# Patient Record
Sex: FEMALE | Race: WHITE
[De-identification: names, ages, dates, MRNs, and addresses within clinical notes are randomized per-mention and may not be internally consistent; named-entity substitution may affect disease eponyms.]

---

## 2020-08-25 ENCOUNTER — HOSPITAL ENCOUNTER (OUTPATIENT)
Dept: HOSPITAL 61 - MAMMO | Age: 45
Discharge: HOME | End: 2020-08-25
Attending: FAMILY MEDICINE
Payer: COMMERCIAL

## 2020-08-25 DIAGNOSIS — Z12.31: Primary | ICD-10-CM

## 2020-08-25 PROCEDURE — 77067 SCR MAMMO BI INCL CAD: CPT

## 2020-08-25 PROCEDURE — 77063 BREAST TOMOSYNTHESIS BI: CPT

## 2020-08-27 NOTE — RAD
DATE: 8/25/2020 3:40 PM



EXAM: MAMMO JEAN-PIERRE SCREENING BILATERAL



HISTORY:  Screening.



COMPARISON: None. This is a baseline.



Bilateral CC and MLO views of the breasts were performed. Bilateral breast

tomosynthesis was performed in CC and MLO projections.



This study was interpreted with the benefit of Computerized Aided Detection

(CAD).





FINDINGS:



Breast Density: HETERO  The breast parenchyma Is heterogeneously dense, which

could reduce sensitivity of mammography. Breast parenchyma level C



Nodular parenchymal pattern compatible benign cystic change.

No suspicious masses, microcalcifications or architectural distortion is

present to suggest malignancy in either breast.





The visualized axillae are unremarkable. 



IMPRESSION: No mammographic evidence of malignancy. 



BI-RADS CATEGORY: 2 BENIGN FINDING(S)



RECOMMENDED FOLLOW-UP: 12M 12 MONTH FOLLOW-UP Annual screening mammography is

recommended, unless clinically indicated sooner based on symptoms or change in

physical exam.



PQRS compliance statement: Patient information was entered into a reminder

system with a target due date for the next mammogram.



Mammography is a sensitive method for finding small breast cancers, but it

does not detect them all and is not a substitute for careful clinical

examination.  A negative mammogram does not negate a clinically suspicious

finding and should not result in delay in biopsying a clinically suspicious

abnormality.



"Our facility is accredited by the American College of [employer] Mammography

Program."

## 2021-08-30 ENCOUNTER — HOSPITAL ENCOUNTER (OUTPATIENT)
Dept: HOSPITAL 61 - MAMMO | Age: 46
End: 2021-08-30
Attending: FAMILY MEDICINE
Payer: COMMERCIAL

## 2021-08-30 DIAGNOSIS — Z12.31: Primary | ICD-10-CM

## 2021-08-30 PROCEDURE — 77063 BREAST TOMOSYNTHESIS BI: CPT

## 2021-08-30 PROCEDURE — 77067 SCR MAMMO BI INCL CAD: CPT

## 2021-08-30 NOTE — RAD
INDICATION: 45 years of age asymptomatic female patient presents for screening mammography.



TECHNIQUE:  Full field craniocaudal and mediolateral oblique images of both breasts were obtained usi
ng digital technique with tomosynthesis and also analyzed with computer-aided detection software.



COMPARISON: Prior mammographic imaging dating back to 8/25/2020.



BREAST COMPOSITION: Category C: The breast tissue is heterogeneously dense, which could obscure detec
tion of small masses.



FINDINGS:

The parenchymal pattern appears stable.  Benign calcifications are present.



No suspicious masses, microcalcifications or architectural distortion is present to suggest malignanc
y in either breast.



The visualized axillae are unremarkable. 



IMPRESSION: No mammographic evidence of malignancy. 



RECOMMENDATION: Annual screening mammography is recommended, unless clinically indicated sooner based
 on symptoms or change in physical exam.



BIRADS 2: BENIGN 



This study was interpreted with the benefit of Computerized Aided Detection (CAD).



?Your patient's mammogram demonstrates that she has dense breast tissue (breast density category C or
 D), which could hide abnormalities, and if she has other risk factors for breast cancer that have be
en identified, she might benefit from supplemental screening tests that may be suggested by you as he
r ordering physician. Dense breast tissue, in and of itself, is a relatively common condition. Theref
ore, this information is not provided to cause undue concern, but rather to raise your awareness and 
to promote discussion with your patient regarding the presence of other risk factors, in addition to 
dense breast tissue. Your patient's mammography results will be sent to her.



Patient information is entered into the reminder system with a target due date for the next screening
 mammogram.



Mammography is the most sensitive method for finding small breast cancers, but it does not detect the
m all and is not a substitute for careful clinical examination. A negative mammogram does not negate 
a clinically suspicious finding and should not result in delay in biopsying a clinically suspicious a
bnormality.



 "Our facility is accredited by the American College of [employer] Mammography Program."



Electronically signed by: Kodi Doran MD (8/30/2021 4:38 PM) [facility]

## 2021-12-14 ENCOUNTER — HOSPITAL ENCOUNTER (EMERGENCY)
Dept: HOSPITAL 61 - ER | Age: 46
Discharge: HOME | End: 2021-12-14
Payer: COMMERCIAL

## 2021-12-14 VITALS — WEIGHT: 142.2 LBS | BODY MASS INDEX: 23.69 KG/M2 | HEIGHT: 65 IN

## 2021-12-14 VITALS — DIASTOLIC BLOOD PRESSURE: 77 MMHG | SYSTOLIC BLOOD PRESSURE: 123 MMHG

## 2021-12-14 DIAGNOSIS — R91.1: ICD-10-CM

## 2021-12-14 DIAGNOSIS — Z20.822: ICD-10-CM

## 2021-12-14 DIAGNOSIS — F43.10: ICD-10-CM

## 2021-12-14 DIAGNOSIS — Z87.891: ICD-10-CM

## 2021-12-14 DIAGNOSIS — F79: Primary | ICD-10-CM

## 2021-12-14 LAB
ALBUMIN SERPL-MCNC: 3.5 G/DL (ref 3.4–5)
ALBUMIN/GLOB SERPL: 1.1 {RATIO} (ref 1–1.7)
ALP SERPL-CCNC: 54 U/L (ref 46–116)
ALT SERPL-CCNC: 23 U/L (ref 14–59)
AMORPH SED URNS QL MICRO: PRESENT /HPF
AMPHETAMINE/METHAMPHETAMINE: (no result)
ANION GAP SERPL CALC-SCNC: 8 MMOL/L (ref 6–14)
APTT PPP: YELLOW S
AST SERPL-CCNC: 19 U/L (ref 15–37)
BACTERIA #/AREA URNS HPF: (no result) /HPF
BARBITURATES UR-MCNC: (no result) UG/ML
BASOPHILS # BLD AUTO: 0 X10^3/UL (ref 0–0.2)
BASOPHILS NFR BLD: 1 % (ref 0–3)
BENZODIAZ UR-MCNC: (no result) UG/L
BILIRUB SERPL-MCNC: 0.2 MG/DL (ref 0.2–1)
BILIRUB UR QL STRIP: NEGATIVE
BUN SERPL-MCNC: 11 MG/DL (ref 7–20)
BUN/CREAT SERPL: 14 (ref 6–20)
CALCIUM SERPL-MCNC: 9 MG/DL (ref 8.5–10.1)
CANNABINOIDS UR-MCNC: (no result) UG/L
CHLORIDE SERPL-SCNC: 92 MMOL/L (ref 98–107)
CO2 SERPL-SCNC: 31 MMOL/L (ref 21–32)
COCAINE UR-MCNC: (no result) NG/ML
CREAT SERPL-MCNC: 0.8 MG/DL (ref 0.6–1)
EOSINOPHIL NFR BLD: 0.1 X10^3/UL (ref 0–0.7)
EOSINOPHIL NFR BLD: 3 % (ref 0–3)
ERYTHROCYTE [DISTWIDTH] IN BLOOD BY AUTOMATED COUNT: 12.9 % (ref 11.5–14.5)
FIBRINOGEN PPP-MCNC: (no result) MG/DL
GFR SERPLBLD BASED ON 1.73 SQ M-ARVRAT: 77.9 ML/MIN
GLUCOSE SERPL-MCNC: 111 MG/DL (ref 70–99)
HCT VFR BLD CALC: 32.9 % (ref 36–47)
HGB BLD-MCNC: 11 G/DL (ref 12–15.5)
LYMPHOCYTES # BLD: 2.4 X10^3/UL (ref 1–4.8)
LYMPHOCYTES NFR BLD AUTO: 57 % (ref 24–48)
MCH RBC QN AUTO: 30 PG (ref 25–35)
MCHC RBC AUTO-ENTMCNC: 33 G/DL (ref 31–37)
MCV RBC AUTO: 90 FL (ref 79–100)
METHADONE SERPL-MCNC: (no result) NG/ML
MONO #: 0.4 X10^3/UL (ref 0–1.1)
MONOCYTES NFR BLD: 9 % (ref 0–9)
NEUT #: 1.3 X10^3/UL (ref 1.8–7.7)
NEUTROPHILS NFR BLD AUTO: 31 % (ref 31–73)
NITRITE UR QL STRIP: NEGATIVE
OPIATES UR-MCNC: (no result) NG/ML
PCP SERPL-MCNC: (no result) MG/DL
PH UR STRIP: 6.5 [PH]
PLATELET # BLD AUTO: 201 X10^3/UL (ref 140–400)
POTASSIUM SERPL-SCNC: 4.2 MMOL/L (ref 3.5–5.1)
PROT SERPL-MCNC: 6.6 G/DL (ref 6.4–8.2)
PROT UR STRIP-MCNC: NEGATIVE MG/DL
RBC # BLD AUTO: 3.64 X10^6/UL (ref 3.5–5.4)
RBC #/AREA URNS HPF: (no result) /HPF (ref 0–2)
SODIUM SERPL-SCNC: 131 MMOL/L (ref 136–145)
UROBILINOGEN UR-MCNC: 1 MG/DL
WBC # BLD AUTO: 4.3 X10^3/UL (ref 4–11)
WBC #/AREA URNS HPF: (no result) /HPF (ref 0–4)

## 2021-12-14 PROCEDURE — 81001 URINALYSIS AUTO W/SCOPE: CPT

## 2021-12-14 PROCEDURE — 80053 COMPREHEN METABOLIC PANEL: CPT

## 2021-12-14 PROCEDURE — 70450 CT HEAD/BRAIN W/O DYE: CPT

## 2021-12-14 PROCEDURE — 93005 ELECTROCARDIOGRAM TRACING: CPT

## 2021-12-14 PROCEDURE — 85025 COMPLETE CBC W/AUTO DIFF WBC: CPT

## 2021-12-14 PROCEDURE — 99285 EMERGENCY DEPT VISIT HI MDM: CPT

## 2021-12-14 PROCEDURE — 84484 ASSAY OF TROPONIN QUANT: CPT

## 2021-12-14 PROCEDURE — 82962 GLUCOSE BLOOD TEST: CPT

## 2021-12-14 PROCEDURE — U0003 INFECTIOUS AGENT DETECTION BY NUCLEIC ACID (DNA OR RNA); SEVERE ACUTE RESPIRATORY SYNDROME CORONAVIRUS 2 (SARS-COV-2) (CORONAVIRUS DISEASE [COVID-19]), AMPLIFIED PROBE TECHNIQUE, MAKING USE OF HIGH THROUGHPUT TECHNOLOGIES AS DESCRIBED BY CMS-2020-01-R: HCPCS

## 2021-12-14 PROCEDURE — 36415 COLL VENOUS BLD VENIPUNCTURE: CPT

## 2021-12-14 PROCEDURE — 71045 X-RAY EXAM CHEST 1 VIEW: CPT

## 2021-12-14 PROCEDURE — 80307 DRUG TEST PRSMV CHEM ANLYZR: CPT

## 2021-12-14 NOTE — RAD
CT HEAD/BRAIN WO



History: Reason: DECREASED MENTATION, SLOWED SPEECH / Spl. Instructions:  / History: 



Comparison: None.



Technique: Noncontrast CT imaging was performed of the head.  



Exposure: One or more of the following individualized dose reduction techniques were utilized for thi
s examination:  

1. Automated exposure control  

2. Adjustment of the mA and/or kV according to patient size  

3. Use of iterative reconstruction technique.



Findings: 

No intracranial hemorrhage. No mass effect. No hydrocephalus.  Extra-axial spaces are unremarkable.



Imaged orbits are unremarkable. Imaged paranasal sinuses and mastoid air cells are clear. No acute ca
lvarial fracture.



Impression:

1.  No acute intracranial abnormality. 



Electronically signed by: Gunnar Coker DO (12/14/2021 2:23 PM) [id number]

## 2021-12-14 NOTE — RAD
XR CHEST 1V



History: Reason: CONFUSION / Spl. Instructions:  / History: 



Comparison: None.



Findings:

No consolidation or pleural effusion. Normal heart size. No pneumothorax. Stimulator projecting over 
the left chest.



Nodular opacity projecting over the left lung base measures 1.1 cm.



Impression: 

1.  No acute cardiopulmonary process.

2.  Nodular opacity projecting over the left lung base, may relate to summation artifact or nodule. R
ecommend short-term radiographic follow-up with PA and lateral view of the chest.



Electronically signed by: Gunnar Coker DO (12/14/2021 2:17 PM) [id number]

## 2021-12-14 NOTE — EKG
[facility]

              [address] [place] [address]

Test Date:    2021               Test Time:    13:22:04

Pat Name:     NALDO JAMES           Department:   

Patient ID:   [id number]           Room:          

Gender:       F                        Technician:   

:          [id number]               Requested By: MICHAEL MOYER

Order Number: [id number]           Reading MD:     

                                 Measurements

Intervals                              Axis          

Rate:         58                       P:            38

MA:           176                      QRS:          49

QRSD:         94                       T:            57

QT:           440                                    

QTc:          431                                    

                           Interpretive Statements

SINUS RHYTHM

LOW LIMB LEAD VOLTAGE

QRS(T) CONTOUR ABNORMALITY

CONSIDER ANTEROLATERAL MYOCARDIAL DAMAGE

CONSIDER INFERIOR MYOCARDIAL DAMAGE

POSSIBLY ABNORMAL ECG

RI6.01

No previous ECG available for comparison

## 2021-12-14 NOTE — PHYS DOC
Past Medical History


Additional Past Medical Histor:  PTSD, sciatica


Past Surgical History:  Hysterectomy


Smoking Status:  Former Smoker


Alcohol Use:  Occasionally





General Adult


EDM:


Chief Complaint:  ALTERED MENTAL STATUS





HPI:


HPI:





Patient is a 46-year-old female presenting for psychomotor retardation.  Reports

this is a subacute issue.  Reports she has history of behavioral health 

abnormalities otherwise no other medical diagnoses and states she has been at [facility].  She then discloses that she recently had a switch from her 

Wellbutrin to Oxcarbamazepine for mood stabilization purposes approximately 3 

weeks ago.  Reports ever since, she has had "felt fuzzy", slowed thought 

process, and slowed speech recognition and subsequent production.  Reports that 

she was seen by her mental health provider 48 hours prior but did not raise t

hese complaints to them.  States symptoms wax and wane but have been more 

constant and more noticeable over past 36 hours without any known inciting 

event, trauma, ingestion, mechanism of injury other concerning exposure.  As 

mentioned, she has no other medical issues and takes no other medications 

besides typically prescribed behavioral health meds.  Has history of alcohol, 

tobacco and "most all" illicit drug dependence with last use being approximately

4 years ago.  States she did relapse and smoke a couple puffs of a cigarette 

yesterday.  No other pertinent family history or congenital abnormalities noted





Review of Systems:


Review of Systems:


Fourteen body systems of review of systems have been reviewed. See HPI for 

pertinent positives and negative responses, other wise all other systems are 

negative, non-pertinent or non-contributory





Heart Score:


C/O Chest Pain:  No


HEART Score for Chest Pain:  








HEART Score for Chest Pain Response (Comments) Value


 


History Slighlty/Non-Suspicious 0


 


ECG Normal 0


 


Age < 45 0


 


Risk Factors No Risk Factors 0


 


Troponin < Normal Limit 0


 


Total  0








Risk Factors:


Risk Factors:  DM, Current or recent (<one month) smoker, HTN, HLP, family 

history of CAD, obesity.


Risk Scores:


Score 0 - 3:  2.5% MACE over next 6 weeks - Discharge Home


Score 4 - 6:  20.3% MACE over next 6 weeks - Admit for Clinical Observation


Score 7 - 10:  72.7% MACE over next 6 weeks - Early Invasive Strategies





Allergies:


Allergies:





Allergies








Coded Allergies Type Severity Reaction Last Updated Verified


 


  No Known Drug Allergies    12/14/21 No











Physical Exam:


PE:


General: Appears well, non toxic, and comfortable


Skin: Warm, dry. Normal for ethnicity.


HEENT: Atraumatic. PERRLA. Moist mucous membranes. 


Neck: Trachea midline. Normal ROM.


Respiratory: Normal WOB. CTAB w/o w/r/r. No tachypnea.


Cardiovascular: Regular rate and rhythm. Normal peripheral perfusion. No edema.


Abdomen: Soft. Non tender. No distension.


Back: Normal ROM.


Musculoskeletal: No swelling or deformity.


Neuro: Alert and oriented x 4. MAEE. GCS 15. Normal FNF. Negative pronator 

drift. Normal heel to shin. Normal Home. CN II-XII intact. Normal strength and 

sensation. Normal speech.


Psych: Odd affect, depressed mood, psychomotor retardation appreciated





Current Patient Data:


Labs:





Laboratory Tests








Test


 12/14/21


12:42 12/14/21


12:55 12/14/21


13:25


 


Urine Collection Type Void   


 


Urine Color Yellow   


 


Urine Clarity Cloudy   


 


Urine pH 6.5   


 


Urine Specific Gravity 1.020   


 


Urine Protein Negative mg/dL   


 


Urine Glucose (UA) Negative mg/dL   


 


Urine Ketones (Stick) Negative mg/dL   


 


Urine Blood Trace   


 


Urine Nitrite Negative   


 


Urine Bilirubin Negative   


 


Urine Urobilinogen Dipstick 1.0 mg/dL   


 


Urine Leukocyte Esterase Negative   


 


Urine RBC Occ /HPF   


 


Urine WBC Occ /HPF   


 


Urine Squamous Epithelial


Cells Few /LPF 


 


 





 


Urine Amorphous Sediment Present /HPF   


 


Urine Bacteria Few /HPF   


 


Urine Opiates Screen Neg   


 


Urine Methadone Screen Neg   


 


Urine Barbiturates Neg   


 


Urine Phencyclidine Screen Neg   


 


Urine


Amphetamine/Methamphetamine Neg 


 


 





 


Urine Benzodiazepines Screen Neg   


 


Urine Cocaine Screen Neg   


 


Urine Cannabinoids Screen Neg   


 


Urine Ethyl Alcohol Neg   


 


Glucose (Fingerstick)  112 mg/dL  


 


White Blood Count   4.3 x10^3/uL 


 


Red Blood Count   3.64 x10^6/uL 


 


Hemoglobin   11.0 g/dL 


 


Hematocrit   32.9 % 


 


Mean Corpuscular Volume   90 fL 


 


Mean Corpuscular Hemoglobin   30 pg 


 


Mean Corpuscular Hemoglobin


Concent 


 


 33 g/dL 





 


Red Cell Distribution Width   12.9 % 


 


Platelet Count   201 x10^3/uL 


 


Neutrophils (%) (Auto)   31 % 


 


Lymphocytes (%) (Auto)   57 % 


 


Monocytes (%) (Auto)   9 % 


 


Eosinophils (%) (Auto)   3 % 


 


Basophils (%) (Auto)   1 % 


 


Neutrophils # (Auto)   1.3 x10^3/uL 


 


Lymphocytes # (Auto)   2.4 x10^3/uL 


 


Monocytes # (Auto)   0.4 x10^3/uL 


 


Eosinophils # (Auto)   0.1 x10^3/uL 


 


Basophils # (Auto)   0.0 x10^3/uL 


 


Sodium Level   131 mmol/L 


 


Potassium Level   4.2 mmol/L 


 


Chloride Level   92 mmol/L 


 


Carbon Dioxide Level   31 mmol/L 


 


Anion Gap   8 


 


Blood Urea Nitrogen   11 mg/dL 


 


Creatinine   0.8 mg/dL 


 


Estimated GFR


(Cockcroft-Gault) 


 


 77.9 





 


BUN/Creatinine Ratio   14 


 


Glucose Level   111 mg/dL 


 


Calcium Level   9.0 mg/dL 


 


Total Bilirubin   0.2 mg/dL 


 


Aspartate Amino Transf


(AST/SGOT) 


 


 19 U/L 





 


Alanine Aminotransferase


(ALT/SGPT) 


 


 23 U/L 





 


Alkaline Phosphatase   54 U/L 


 


Troponin I High Sensitivity   4 ng/L 


 


Total Protein   6.6 g/dL 


 


Albumin   3.5 g/dL 


 


Albumin/Globulin Ratio   1.1 








Vital Signs:





                                   Vital Signs








  Date Time  Temp Pulse Resp B/P (MAP) Pulse Ox O2 Delivery O2 Flow Rate FiO2


 


12/14/21 12:44 97.8 59 18 132/82 (99) 100 Room Air  





 97.8       











EKG:


EKG:


EKG ordered and interpreted by myself at 1331 hrs. is sinus rhythm at 58 bpm, 

unremarkable intervals, no axis deviation, no acute ischemic findings, no STEMI





Radiology/Procedures:


Radiology/Procedures:





XR CHEST 1V





History: Reason: CONFUSION / Spl. Instructions:  / History: 





Comparison: None.





Findings:


No consolidation or pleural effusion. Normal heart size. No pneumothorax. 

Stimulator projecting over the left chest.





Nodular opacity projecting over the left lung base measures 1.1 cm.





Impression: 


1.  No acute cardiopulmonary process.


2.  Nodular opacity projecting over the left lung base, may relate to summation 

artifact or nodule. Recommend short-term radiographic follow-up with PA and 

lateral view of the chest.





///////////////////








CT HEAD/BRAIN WO





History: Reason: DECREASED MENTATION, SLOWED SPEECH / Spl. Instructions:  / 

History: 





Comparison: None.





Technique: Noncontrast CT imaging was performed of the head.  





Exposure: One or more of the following individualized dose reduction techniques 

were utilized for this examination:  


1. Automated exposure control  


2. Adjustment of the mA and/or kV according to patient size  


3. Use of iterative reconstruction technique.





Findings: 


No intracranial hemorrhage. No mass effect. No hydrocephalus.  Extra-axial 

spaces are unremarkable.





Imaged orbits are unremarkable. Imaged paranasal sinuses and mastoid air cells 

are clear. No acute calvarial fracture.





Impression:


1.  No acute intracranial abnormality. 





Electronically signed by: Gunnar Coker DO (12/14/2021 2:23 PM) [id number]





Course & Med Decision Making:


Course & Med Decision Making


ABCs unremarkable. I disclosed entirety of ER findings and discussed most likely

diagnosis of psychomotor retardation as a side effect of patient's mental health

medication.  I also disclosed finding of pulmonary nodule that was incidental 

and will require further outpatient work-up. Other diagnoses were discussed with

patient such as stroke, and other potentially life or limb threatening pathology

but all deemed less likely causes of patient's presentation. Plan of care 

discussed at length with need for close outpatient follow-up to review today's 

ER visit stressed. Strict return precautions were also discussed at length with 

good understanding verbalized by patient.  Discussed need to review ongoing plan

of care with current medication with behavioral health provider prior to 

completely discontinuing and/or switching.  Patient voiced understanding and 

agreement with the plan. Patient knows to come back for repeat evaluation if 

concerning signs or symptoms present prior to outpatient follow-up. Hem

odynamically stable, ambulatory and well-appearing at time of disposition.





Dragon Disclaimer:


Dragon Disclaimer:


This electronic medical record was generated, in whole or in part, using a voice

recognition dictation system.





Departure


Departure


Impression:  


   Primary Impression:  


   Psychomotor retardation


   Additional Impression:  


   Pulmonary nodule


Disposition:  01 HOME / SELF CARE / HOMELESS


Condition:  STABLE


Referrals:  


RAPHAEL HEART MD (PCP)





Additional Instructions:  


As discussed prior to ER departure, your vitals, physical exam and comprehensive

ER work-up were nonconcerning for any emergent or surgical issues.  I discussed 

my concern for your presenting symptoms to be linked with neuropsychiatric 

effects from your oxcarbazepine medication.  You are exhibiting classic symptoms

of psychomotor retardation such as lack of concentration, speech disturbance, 

drowsiness and fatigue.  Recommendations would be to discuss ongoing use with yo

 behavioral health provider and need for potential change and/or lower dose.  

In addition, an incidental pulmonary nodule was found of your left lower lobe.  

This is nonemergent in nature and close outpatient follow-up with primary care 

physician for repeat PA and lateral film chest x-ray and/or CT chest is advised.

 If any concerning signs or symptoms present prior to outpatient follow-up 

please do not hesitate to come back for repeat evaluation.  It was a pleasure to

take care of you and I wish you the best going forward











MICHAEL MOYER DO                 Dec 14, 2021 13:39